# Patient Record
(demographics unavailable — no encounter records)

---

## 2024-12-18 NOTE — PHYSICAL EXAM
[Normal] : oriented to person, place and time, the affect was normal, the mood was normal and not anxious [de-identified] : remains wheelchair bound [FreeTextEntry1] : deferred [de-identified] : deferred

## 2024-12-18 NOTE — PHYSICAL EXAM
[Normal] : oriented to person, place and time, the affect was normal, the mood was normal and not anxious [de-identified] : remains wheelchair bound [FreeTextEntry1] : deferred [de-identified] : deferred

## 2024-12-18 NOTE — HISTORY OF PRESENT ILLNESS
[FreeTextEntry1] : This 67-year-old male presents for radiation medicine follow-up.  Mr. Wing completed radiation therapy (7020 cGy) for very high risk prostate cancer, [place] score 4+5=9.  At post-treatment evaluation: Patient reported he continued tamsulosin.  Advised to continue until he is not having feelings of incomplete emptying and weak stream. Reported formed bowel movements up to 3 times per day. Receives ADT per Dr. Charles. Noted frequent "hot flashes".  12/17/2024 Presents for routine follow up visit.   Patient notes he missed his July 2024 scheduled Eligard injection. He has since received an Eligard injection on in September by Dr. Brizuela at the [facility].  He notes his next injection will be a 6-month injection.  PSA trend (ng/mL) 5/22/2024 = 0.14  12/10/24 = 0.13  Patient notes he has some urinary hesitancy as well as dribbling after urination ends.  However, he notes these symptoms have improved with radiation therapy.

## 2024-12-18 NOTE — HISTORY OF PRESENT ILLNESS
[FreeTextEntry1] : This 67-year-old male presents for radiation medicine follow-up.  Mr. Wing completed radiation therapy (7020 cGy) for very high risk prostate cancer, [place] score 4+5=9.  At post-treatment evaluation: Patient reported he continued tamsulosin.  Advised to continue until he is not having feelings of incomplete emptying and weak stream. Reported formed bowel movements up to 3 times per day. Receives ADT per Dr. Charles. Noted frequent "hot flashes".  12/17/2024 Presents for routine follow up visit.   Patient notes he missed his July 2024 scheduled Eligard injection. He has since received an Eligard injection on in September by Dr. Brizuela at the [facility] administration.  He notes his next injection will be a 6-month injection.  PSA trend (ng/mL) 5/22/2024 = 0.14  12/10/24 = 0.13  Patient notes he has some urinary hesitancy as well as dribbling after urination ends.  However, he notes these symptoms have improved with radiation therapy.

## 2024-12-18 NOTE — DISEASE MANAGEMENT
[2] : T2 [1] : N1 [0] : M0 [10-20] : 10 - 20 ng/mL [Biopsy] : Patient had a biopsy on [9] : Template Biopsy Camelia Score: 9 [CHINTAN] : CHINTAN [Radiation Therapy] : Radiation Therapy [Androgen Ablation] : Androgen Ablation [Treatment with radiation therapy] : Treatment with radiation therapy [Treatment with androgen ablation] : Treatment with androgen ablation [] : Patient had no Prostate MRI performed [BiopsyDate] : 5/10/2023 [TotalCores] : 13 [TotalPositiveCores] : 12 [MaxCoreInvolvement] : 100 [EBRTDose] : 7020 cGy

## 2024-12-18 NOTE — HISTORY OF PRESENT ILLNESS
[FreeTextEntry1] : This 67-year-old male presents for radiation medicine follow-up.  Mr. Wing completed radiation therapy (7020 cGy) for very high risk prostate cancer, [place] score 4+5=9.  At post-treatment evaluation: Patient reported he continued tamsulosin.  Advised to continue until he is not having feelings of incomplete emptying and weak stream. Reported formed bowel movements up to 3 times per day. Receives ADT per Dr. Charles. Noted frequent "hot flashes".  12/17/2024 Presents for routine follow up visit.   Patient notes he missed his July 2024 scheduled Eligard injection. He has since received an Eligard injection on in September by Dr. Brizuela at the [facility] administration.  He notes his next injection will be a 6-month injection.  PSA trend (ng/mL) 5/22/2024 = 0.14  12/10/24 = 0.13  Patient notes he has some urinary hesitancy as well as dribbling after urination ends.  However, he notes these symptoms have improved with radiation therapy.

## 2024-12-18 NOTE — REVIEW OF SYSTEMS
[IPSS Score (0-40): ___] : IPSS score: [unfilled] [EPIC-CP Score (0-60): ___] : EPIC-CP score: [unfilled] [Negative] : Allergic/Immunologic [FreeTextEntry7] : noted some blood on toilet paper yesterday. Does have a history of  hemorrhoids.

## 2024-12-18 NOTE — VITALS
[Maximal Pain Intensity: 0/10] : 0/10 [NoTreatment Scheduled] : no treatment scheduled [Least Pain Intensity: 0/10] : 0/10 [70: Cares for self; unalbe to carry on normal activity or do active work.] : 70: Cares for self; unable to carry on normal activity or do active work. [ECOG Performance Status: 2 - Ambulatory and capable of all self care but unable to carry out any work activities] : Performance Status: 2 - Ambulatory and capable of all self care but unable to carry out any work activities. Up and about more than 50% of waking hours [0 - No Distress] : Distress Level: 0

## 2025-06-14 NOTE — DISEASE MANAGEMENT
[2] : T2 [1] : N1 [0] : M0 [10-20] : 10 - 20 ng/mL [Biopsy] : Patient had a biopsy on [9] : Template Biopsy Camelia Score: 9 [] : Patient had no Prostate MRI performed [BiopsyDate] : 5/10/2023 [TotalCores] : 13 [MaxCoreInvolvement] : 100 [TotalPositiveCores] : 12 [CHINTAN] : CHINTAN [Radiation Therapy] : Radiation Therapy [Androgen Ablation] : Androgen Ablation [Treatment with radiation therapy] : Treatment with radiation therapy [Treatment with androgen ablation] : Treatment with androgen ablation [EBRTDose] : 7020 cGy

## 2025-06-14 NOTE — REVIEW OF SYSTEMS
[IPSS Score (0-40): ___] : IPSS score: [unfilled] [EPIC-CP Score (0-60): ___] : EPIC-CP score: [unfilled] [Negative] : Allergic/Immunologic [FreeTextEntry7] : Hx of  hemorrhoids.

## 2025-06-14 NOTE — HISTORY OF PRESENT ILLNESS
[FreeTextEntry1] : This 67-year-old male presents for radiation medicine follow-up.  Mr. Wing completed radiation therapy (7020 cGy) for very high risk prostate cancer, [place] score 4+5=9.  Receives ADT per Dr. Charles. Noted frequent "hot flashes".  12/17/2024 Routine follow up visit.   Patient notes he missed his July 2024 scheduled Eligard injection. He has since received an Eligard injection on in September by Dr. Brizuela at the [facility].  He notes his next injection will be a 6-month injection. Patient noted he has some urinary hesitancy as well as dribbling after urination ends.  However, he notes these symptoms have improved with radiation therapy.  PSA trend (ng/mL) 6/25/25 = 0.05 5/22/24 = 0.14  12/10/24 = 0.13